# Patient Record
Sex: MALE | Race: WHITE
[De-identification: names, ages, dates, MRNs, and addresses within clinical notes are randomized per-mention and may not be internally consistent; named-entity substitution may affect disease eponyms.]

---

## 2017-08-08 ENCOUNTER — HOSPITAL ENCOUNTER (INPATIENT)
Dept: HOSPITAL 74 - YASAS | Age: 57
LOS: 2 days | Discharge: LEFT BEFORE DISCHARGE | DRG: 770 | End: 2017-08-10
Attending: INTERNAL MEDICINE | Admitting: INTERNAL MEDICINE
Payer: COMMERCIAL

## 2017-08-08 VITALS — BODY MASS INDEX: 23.6 KG/M2

## 2017-08-08 DIAGNOSIS — F19.24: ICD-10-CM

## 2017-08-08 DIAGNOSIS — K21.9: ICD-10-CM

## 2017-08-08 DIAGNOSIS — F17.213: ICD-10-CM

## 2017-08-08 DIAGNOSIS — J45.20: ICD-10-CM

## 2017-08-08 DIAGNOSIS — I10: ICD-10-CM

## 2017-08-08 DIAGNOSIS — F11.23: Primary | ICD-10-CM

## 2017-08-08 LAB
APPEARANCE UR: CLEAR
BILIRUB UR STRIP.AUTO-MCNC: NEGATIVE MG/DL
COLOR UR: YELLOW
KETONES UR QL STRIP: NEGATIVE
LEUKOCYTE ESTERASE UR QL STRIP.AUTO: NEGATIVE
NITRITE UR QL STRIP: NEGATIVE
PH UR: 6 [PH] (ref 5–8)
PROT UR QL STRIP: NEGATIVE
PROT UR QL STRIP: NEGATIVE
RBC # UR STRIP: NEGATIVE /UL
SP GR UR: 1.02 (ref 1–1.02)
UROBILINOGEN UR STRIP-MCNC: 2 MG/DL (ref 0.2–1)

## 2017-08-08 PROCEDURE — HZ2ZZZZ DETOXIFICATION SERVICES FOR SUBSTANCE ABUSE TREATMENT: ICD-10-PCS | Performed by: INTERNAL MEDICINE

## 2017-08-08 RX ADMIN — BUDESONIDE AND FORMOTEROL FUMARATE DIHYDRATE SCH INH: 80; 4.5 AEROSOL RESPIRATORY (INHALATION) at 22:19

## 2017-08-08 RX ADMIN — BUDESONIDE AND FORMOTEROL FUMARATE DIHYDRATE SCH INH: 80; 4.5 AEROSOL RESPIRATORY (INHALATION) at 13:24

## 2017-08-08 RX ADMIN — Medication SCH MG: at 22:18

## 2017-08-08 RX ADMIN — NICOTINE SCH: 21 PATCH TRANSDERMAL at 13:25

## 2017-08-08 RX ADMIN — LISINOPRIL SCH MG: 20 TABLET ORAL at 13:22

## 2017-08-08 NOTE — HP
COWS





- Scale


Resting Pulse: 0= [place] 80 or Below


Sweatin=Flushed/Facial Moisture


Restless Observation: 1= Difficult to Sit Still


Pupil Size: 1= Pupils >than Normal


Bone or Joint Aches: 1= Mild Discomfort


Runny Nose/ Eye Tearin= Runny Nose/Eyes


GI Upset > 30mins: 2= Nausea/Diarrhea


Tremor Observation: 2= Slight Tremor Visible


Yawning Observation: 1= 1-2x During Session


Anxiety or Irritability: 2=Irritable/Anxious


Goose Flesh Skin: 0=Smooth Skin


COWS Score: 14





Admission ROS S





- HPI


Chief Complaint: 


Withdrawal sx.


Allergies/Adverse Reactions: 


 Allergies











Allergy/AdvReac Type Severity Reaction Status Date / Time


 


No Known Allergies Allergy   Verified 17 10:17











History of Present Illness: 


56 y/o man with a long hx of drug dependence is admitted for detox. Pt. has 

been in previous detox, he was only drug free while incarcerated.


Exam Limitations: No Limitations





- Ebola screening


Have you traveled outside of the country in the last 21 days: No


Have you had contact with anyone from an Ebola affected area: No


Have you been sick,other than usual withdrawal symptoms: No


Do you have a fever: No





- Review of Systems


Constitutional: Diaphoresis


EENT: reports: Nose Congestion


Respiratory: reports: No Symptoms reported


Cardiac: reports: No Symptoms Reported


GI: reports: Nausea, Abdominal cramping


: reports: No Symptoms Reported


Musculoskeletal: reports: Joint Pain


Integumentary: reports: Sweating


Neuro: reports: Headache


Endocrine: reports: No Symptoms Reported


Hematology: reports: No Symptoms Reported


Psychiatric: reports: No Sypmtoms Reported


Other Systems: Reviewed and Negative





Patient History





- Patient Medical History


Hx Anemia: No


Hx Asthma: Yes


Hx Chronic Obstructive Pulmonary Disease (COPD): No


Hx Cancer: No


Hx Cardiac Disorders: No


Hx Congestive Heart Failure: No


Hx Hypertension: Yes


Hx Hypercholesterolemia: No


Hx Pacemaker: No


HX Cerebrovascular Accident: No


Hx Seizures: No


Hx Dementia: No


Hx Diabetes: No


Hx Gastrointestinal Disorders: Yes (acid reflux)


Hx Liver Disease: No


Hx Genitourinary Disorders: No


Hx Sexually Transmitted Disorders: No


Hx Renal Disease (ESRD): No


Hx Thyroid Disease: No


Hx Human Immunodeficiency Virus (HIV): No


Hx Hepatitis C: Yes (no tx. needed)


Hx Depression: Yes


Hx Suicide Attempt: No


Hx Bipolar Disorder: Yes (was on abilify,last taken a week ago)


Hx Schizophrenia: No


Other Medical History: PTSD





- Patient Surgical History


Past Surgical History: No


Hx Neurologic Surgery: No


Hx Cataract Extraction: No


Hx Cardiac Surgery: No


Hx Lung Surgery: No


Hx Breast Surgery: No


Hx Breast Biopsy: No


Hx Abdominal Surgery: No


Hx Appendectomy: No


Hx Cholecystectomy: No


Hx Genitourinary Surgery: No


Hx  Section: No


Hx Orthopedic Surgery: No


Anesthesia Reaction: No





- PPD History


Previous Implant?: Yes


Documented Results: Negative w/proof


Implanted On Prior R Admission?: Yes


Date: 16


Results: 0 mm


PPD to be Administered?: No





- Smoking Cessation


Smoking history: Current every day smoker


Have you smoked in the past 12 months: Yes


Aproximately how many cigarettes per day: 10


Hx Chewing Tobacco Use: No


Initiated information on smoking cessation: Yes


'Breaking Loose' booklet given: 17





- Substance & Tx. History


Hx Alcohol Use: No


Hx Substance Use: Yes


Substance Use Type: Heroin, Tranquilizers


Hx Substance Use Treatment: Yes (Detox)





- Substances Abused


  ** Heroin


Route: Inhalation


Frequency: Daily


Amount used: 5-6 bags


Age of first use: 16


Date of Last Use: 17





  ** Xanax


Route: Oral


Frequency: 1-2 times per week


Amount used: 1 mg.


Age of first use: 57


Date of Last Use: 17





Family Disease History





- Family Disease History


Family Disease History: Heart Disease: Mother (HTN)





Admission Physical Exam [facility]





- Vital Signs


Vital Signs: 


 Vital Signs - 24 hr











  17





  09:59


 


Temperature 97 F L


 


Pulse Rate 74


 


Respiratory 20





Rate 


 


Blood Pressure 163/96














- Physical


General Appearance: Yes: Tremorous, Anxious


HEENTM: Yes: Nasal Congestion, Rhinorrhea


Respiratory: Yes: Chest Non-Tender, Lungs Clear, Normal Breath Sounds


Neck: Yes: Supple


Breast: Yes: Breast Exam Deferred


Cardiology: Yes: Regular Rhythm, Regular Rate, S1, S2


Abdominal: Yes: Normal Bowel Sounds, Non Tender, Flat, Soft


Genitourinary: Yes: Within Normal Limits


Back: Yes: Within Normal Limits


Musculoskeletal: Yes: full range of Motion


Extremities: Yes: Tremors


Neurological: Yes: Fully Oriented, Alert


Integumentary: Yes: Diaphoresis


Lymphatic: Yes: Within Normal Limits





- Diagnostic


(1) Nicotine dependence


Current Visit: Yes   Status: Acute   Qualifiers: 


     Nicotine product type: cigarettes     Substance use status: uncomplicated 

    Qualified Code(s): F17.210 - Nicotine dependence, cigarettes, uncomplicated

  





(2) Opioid dependence with withdrawal


Current Visit: Yes   Status: Acute





(3) Asthma


Current Visit: Yes   Status: Chronic   Qualifiers: 


     Asthma severity: mild intermittent     Asthma complication type: 

uncomplicated        Qualified Code(s): J45.20 - Mild intermittent asthma, 

uncomplicated  





(4) Essential hypertension


Current Visit: Yes   Status: Chronic


Comment: .











Cleared for Admission [facility]





- Detox or Rehab


[facility] Level of Care: Medically Managed


Detox Regimen/Protocol: Methadone





[facility] Breath Alcohol Content


Breath Alcohol Content: 0





Urine Drug Screen





- Results


Drug Screen Negative: No


Urine Drug Screen Results: OPI-Opiates

## 2017-08-08 NOTE — EKG
Test Reason : 

Blood Pressure : ***/*** mmHG

Vent. Rate : 061 BPM     Atrial Rate : 061 BPM

   P-R Int : 160 ms          QRS Dur : 088 ms

    QT Int : 388 ms       P-R-T Axes : 078 017 033 degrees

   QTc Int : 390 ms

 

NORMAL SINUS RHYTHM

VOLTAGE CRITERIA FOR LEFT VENTRICULAR HYPERTROPHY

ABNORMAL ECG

NO PREVIOUS ECGS AVAILABLE

CLINICAL CORRELATION IS RECOMMENDED

Confirmed by FADY PARHAM MD ([id number]) on 8/8/2017 9:10:32 PM

 

Referred By:             Confirmed By:FADY PARHAM MD

## 2017-08-09 LAB
ALBUMIN SERPL-MCNC: 3.6 G/DL (ref 3.4–5)
ALP SERPL-CCNC: 75 U/L (ref 45–117)
ALT SERPL-CCNC: 17 U/L (ref 12–78)
ANION GAP SERPL CALC-SCNC: 5 MMOL/L (ref 8–16)
AST SERPL-CCNC: 14 U/L (ref 15–37)
BILIRUB SERPL-MCNC: 0.5 MG/DL (ref 0.2–1)
CALCIUM SERPL-MCNC: 9.1 MG/DL (ref 8.5–10.1)
CO2 SERPL-SCNC: 31 MMOL/L (ref 21–32)
CREAT SERPL-MCNC: 0.7 MG/DL (ref 0.7–1.3)
DEPRECATED RDW RBC AUTO: 12.8 % (ref 11.9–15.9)
GLUCOSE SERPL-MCNC: 77 MG/DL (ref 74–106)
MCH RBC QN AUTO: 31.3 PG (ref 25.7–33.7)
MCHC RBC AUTO-ENTMCNC: 33.4 G/DL (ref 32–35.9)
MCV RBC: 93.6 FL (ref 80–96)
PLATELET # BLD AUTO: 159 K/MM3 (ref 134–434)
PMV BLD: 9.5 FL (ref 7.5–11.1)
PROT SERPL-MCNC: 6.6 G/DL (ref 6.4–8.2)
WBC # BLD AUTO: 6.1 K/MM3 (ref 4–10)

## 2017-08-09 RX ADMIN — BUDESONIDE AND FORMOTEROL FUMARATE DIHYDRATE SCH: 80; 4.5 AEROSOL RESPIRATORY (INHALATION) at 22:25

## 2017-08-09 RX ADMIN — CYCLOBENZAPRINE HYDROCHLORIDE SCH: 10 TABLET, FILM COATED ORAL at 14:26

## 2017-08-09 RX ADMIN — NICOTINE SCH: 21 PATCH TRANSDERMAL at 10:02

## 2017-08-09 RX ADMIN — LISINOPRIL SCH MG: 20 TABLET ORAL at 10:02

## 2017-08-09 RX ADMIN — CYCLOBENZAPRINE HYDROCHLORIDE SCH: 10 TABLET, FILM COATED ORAL at 22:25

## 2017-08-09 RX ADMIN — Medication SCH: at 22:25

## 2017-08-09 RX ADMIN — BUDESONIDE AND FORMOTEROL FUMARATE DIHYDRATE SCH INH: 80; 4.5 AEROSOL RESPIRATORY (INHALATION) at 10:00

## 2017-08-09 RX ADMIN — Medication SCH TAB: at 09:59

## 2017-08-09 NOTE — CONSULT
[facility] Psychiatric Consult





- Data


Date of interview: 08/09/17


Admission source: [facility]


Identifying data: Another admission to [facility] for this 56 y/o  male 

seeking detox treatment [address] for opioid dependence.Patient is single,a 

father of one,domiciled and employed (in December 2016 he reported to this 

writer that he was on SSI benefits).


Substance Abuse History: Discussed in this session.Mr Cortes confirms this report 

as accurate.  Smoking Cessation.  Smoking history: Current every day smoker.  

Have you smoked in the past 12 months: Yes.  Aproximately how many cigarettes 

per day: 10.  Hx Chewing Tobacco Use: No.  Initiated information on smoking 

cessation: Yes.  'Breaking Loose' booklet given: 08/08/17.  - Substance & Tx. 

History.  Hx Alcohol Use: No.  Hx Substance Use: Yes.  Substance Use Type: 

Heroin, Tranquilizers.  Hx Substance Use Treatment: Yes (Detox).  - Substances 

Abused.  ** Heroin.  Route: Inhalation.  Frequency: Daily.  Amount used: 5-6 

bags.  Age of first use: 16.  Date of Last Use: 08/07/17.  ** Xanax.  Route: 

Oral.  Frequency: 1-2 times per week.  Amount used: 1 mg.  Age of first use: 

57.  Date of Last Use: 07/25/17


Medical History: Hypertension,GERD,bronchial asthma and spinal stenosis.


Psychiatric History: History as follows : first contact with Psychiatry was in 

2010 (after his release from senior care).Diagnosed with Bipolar Disorder and 

PTSD.Mr Cortes used to be on lamotrigine and other mood stabilizers.Known to [facility] OPD clinic.Non adherent to OPD care for several months and 

still not willing to resume psychotropic medications.No reported history of 

psychiatric hospitalizations or suicide attempts.


Physical/Sexual Abuse/Trauma History: Patient denies.


Additional Comment: Urine Drug Screen Results: OPI-Opiates.Noted.





Mental Status Exam





- Mental Status Exam


Alert and Oriented to: Time, Place, Person


Cognitive Function: Good


Patient Appearance: Well Groomed


Mood: Hopeful, Euthymic


Affect: Appropriate, Normal Range


Patient Behavior: Appropriate, Cooperative


Speech Pattern: Clear


Voice Loudness: Normal


Thought Process: Goal Oriented


Thought Disorder: Not Present


Hallucinations: Denies


Suicidal Ideation: Denies


Homicidal Ideation: Denies


Insight/Judgement: Poor


Sleep: Well


Appetite: Good


Muscle strength/Tone: Normal


Gait/Station: Normal





Psychiatric Findings





- Problem List (Axis 1, 2,3)


(1) Nicotine dependence


Current Visit: Yes   Status: Acute   Qualifiers: 


     Nicotine product type: cigarettes     Substance use status: uncomplicated 

    Qualified Code(s): F17.210 - Nicotine dependence, cigarettes, uncomplicated

  





(2) Opioid dependence with withdrawal


Current Visit: Yes   Status: Acute





(3) Substance induced mood disorder


Current Visit: Yes   Status: Acute





(4) Asthma


Current Visit: Yes   Status: Chronic   Qualifiers: 


     Asthma severity: mild intermittent     Asthma complication type: 

uncomplicated        Qualified Code(s): J45.20 - Mild intermittent asthma, 

uncomplicated  





(5) Essential hypertension


Current Visit: Yes   Status: Chronic


Comment: .











- Initial Treatment Plan


Initial Treatment Plan: Psychoeducation.Detoxification.Patient declines 

psychotropic medications other than detox protocol.Observation.

## 2017-08-09 NOTE — PN
[facility] COWS





- Scale


Resting Pulse: 1= [place] 


Sweatin= Chills/Flushing


Restless Observation: 3= Extraneous Movement


Pupil Size: 2= Moderately Dilated


Bone or Joint Aches: 2= Severe Diffuse Aches


Runny Nose/ Eye Tearin= Nasal Congestion


GI Upset > 30mins: 2= Nausea/Diarrhea


Tremor Observation of Outstretched Hands: 2= Slight Tremor Visible


Yawning Observation: 2= >3x During Session


Anxiety or Irritability: 2=Irritable/Anxious


Goose Flesh Skin: 0=Smooth Skin


COWS Score: 18





[facility] Progress Note (SOAP)


Subjective: 


BODY ACHE, HOT/COLD FLASHES,FATIGUE.


Objective: 





17 11:37


 Vital Signs











Temperature  97.8 F   17 09:35


 


Pulse Rate  93 H  17 09:35


 


Respiratory Rate  20   17 09:35


 


Blood Pressure  142/95   17 09:35


 


O2 Sat by Pulse Oximetry (%)      








 Laboratory Last Values











WBC  6.1 K/mm3 (4.0-10.0)   17  06:20    


 


RBC  4.18 M/mm3 (4.00-5.60)   17  06:20    


 


Hgb  13.1 GM/dL (11.7-16.9)   17  06:20    


 


Hct  39.1 % (35.4-49)   17  06:20    


 


MCV  93.6 fl (80-96)   17  06:20    


 


MCH  31.3 pg (25.7-33.7)   17  06:20    


 


MCHC  33.4 g/dl (32.0-35.9)   17  06:20    


 


RDW  12.8 % (11.9-15.9)   17  06:20    


 


Plt Count  159 K/MM3 (134-434)   17  06:20    


 


MPV  9.5 fl (7.5-11.1)   17  06:20    


 


Sodium  138 mmol/L (136-145)   17  06:20    


 


Potassium  4.9 mmol/L (3.5-5.1)   17  06:20    


 


Chloride  102 mmol/L ()   17  06:20    


 


Carbon Dioxide  31 mmol/L (21-32)   17  06:20    


 


Anion Gap  5  (8-16)  L  17  06:20    


 


BUN  13 mg/dL (7-18)   17  06:20    


 


Creatinine  0.7 mg/dL (0.7-1.3)   17  06:20    


 


Creat Clearance w eGFR  > 60  (>60)   17  06:20    


 


Random Glucose  77 mg/dL ()  D 17  06:20    


 


Calcium  9.1 mg/dL (8.5-10.1)   17  06:20    


 


Total Bilirubin  0.5 mg/dL (0.2-1.0)   17  06:20    


 


AST  14 U/L (15-37)  L  17  06:20    


 


ALT  17 U/L (12-78)   17  06:20    


 


Alkaline Phosphatase  75 U/L ()   17  06:20    


 


Total Protein  6.6 g/dl (6.4-8.2)   17  06:20    


 


Albumin  3.6 g/dl (3.4-5.0)   17  06:20    


 


Urine Color  Yellow   17  17:06    


 


Urine Appearance  Clear   17  17:06    


 


Urine pH  6.0  (5.0-8.0)   17  17:06    


 


Ur Specific Gravity  1.020  (1.005-1.025)   17  17:06    


 


Urine Protein  Negative  (NEGATIVE)   17  17:06    


 


Urine Glucose (UA)  Negative  (NEGATIVE)   17  17:06    


 


Urine Ketones  Negative  (NEGATIVE)   17  17:06    


 


Urine Blood  Negative  (NEGATIVE)   17  17:06    


 


Urine Nitrite  Negative  (NEGATIVE)   17  17:06    


 


Urine Bilirubin  Negative  (NEGATIVE)   17  17:06    


 


Urine Urobilinogen  2.0 mg/dL (0.2-1.0)   17  17:06    


 


Ur Leukocyte Esterase  Negative  (NEGATIVE)   17  17:06    


 


RPR Titer  Nonreactive  (NONREACTIVE)   17  06:20    











Assessment: 





17 11:37


WITHDRAWAL SX


Plan: 


CONTINUE DETOX

## 2017-08-10 VITALS — DIASTOLIC BLOOD PRESSURE: 70 MMHG | SYSTOLIC BLOOD PRESSURE: 105 MMHG | HEART RATE: 99 BPM | TEMPERATURE: 99 F

## 2017-08-10 RX ADMIN — LISINOPRIL SCH MG: 20 TABLET ORAL at 10:44

## 2017-08-10 RX ADMIN — Medication SCH TAB: at 10:44

## 2017-08-10 RX ADMIN — CYCLOBENZAPRINE HYDROCHLORIDE SCH: 10 TABLET, FILM COATED ORAL at 06:56

## 2017-08-10 RX ADMIN — BUDESONIDE AND FORMOTEROL FUMARATE DIHYDRATE SCH: 80; 4.5 AEROSOL RESPIRATORY (INHALATION) at 10:47

## 2017-08-10 RX ADMIN — NICOTINE SCH: 21 PATCH TRANSDERMAL at 10:44

## 2017-08-10 NOTE — PN
[facility] COWS





- Scale


Resting Pulse: 1= [place] 


Sweatin= Chills/Flushing


Restless Observation: 3= Extraneous Movement


Pupil Size: 2= Moderately Dilated


Bone or Joint Aches: 4=Acute Joint/Muscle Pain


Runny Nose/ Eye Tearin= Nasal Congestion


GI Upset > 30mins: 1= Stomach Cramp


Tremor Observation of Outstretched Hands: 2= Slight Tremor Visible


Yawning Observation: 1= 1-2x During Session


Anxiety or Irritability: 2=Irritable/Anxious


Goose Flesh Skin: 0=Smooth Skin


COWS Score: 18





[facility] Progress Note (SOAP)


Subjective: 


ANXIETY,IRRITABILITY,SWEATS,INTERMITTENT SLEEP. PT STATES HE IS ON LISINOPRIL 

AND NORVASC 10 MG FOR HTN.


Objective: 





08/10/17 10:48


 


 


 Vital Signs











  08/10/17 08/10/17 08/10/17





  06:34 07:41 09:27


 


Temperature 97.3 F L  99.0 F


 


Pulse Rate 82 68 99 H


 


Respiratory 18  18





Rate   


 


Blood Pressure 163/103 176/105 105/70




















 Laboratory Last Values











WBC  6.1 K/mm3 (4.0-10.0)   17  06:20    


 


RBC  4.18 M/mm3 (4.00-5.60)   17  06:20    


 


Hgb  13.1 GM/dL (11.7-16.9)   17  06:20    


 


Hct  39.1 % (35.4-49)   17  06:20    


 


MCV  93.6 fl (80-96)   17  06:20    


 


MCH  31.3 pg (25.7-33.7)   17  06:20    


 


MCHC  33.4 g/dl (32.0-35.9)   17  06:20    


 


RDW  12.8 % (11.9-15.9)   17  06:20    


 


Plt Count  159 K/MM3 (134-434)   17  06:20    


 


MPV  9.5 fl (7.5-11.1)   17  06:20    


 


Sodium  138 mmol/L (136-145)   17  06:20    


 


Potassium  4.9 mmol/L (3.5-5.1)   17  06:20    


 


Chloride  102 mmol/L ()   17  06:20    


 


Carbon Dioxide  31 mmol/L (21-32)   17  06:20    


 


Anion Gap  5  (8-16)  L  17  06:20    


 


BUN  13 mg/dL (7-18)   17  06:20    


 


Creatinine  0.7 mg/dL (0.7-1.3)   17  06:20    


 


Creat Clearance w eGFR  > 60  (>60)   17  06:20    


 


Random Glucose  77 mg/dL ()  D 17  06:20    


 


Calcium  9.1 mg/dL (8.5-10.1)   17  06:20    


 


Total Bilirubin  0.5 mg/dL (0.2-1.0)   17  06:20    


 


AST  14 U/L (15-37)  L  17  06:20    


 


ALT  17 U/L (12-78)   17  06:20    


 


Alkaline Phosphatase  75 U/L ()   17  06:20    


 


Total Protein  6.6 g/dl (6.4-8.2)   17  06:20    


 


Albumin  3.6 g/dl (3.4-5.0)   17  06:20    


 


Urine Color  Yellow   17  17:06    


 


Urine Appearance  Clear   17  17:06    


 


Urine pH  6.0  (5.0-8.0)   17  17:06    


 


Ur Specific Gravity  1.020  (1.005-1.025)   17  17:06    


 


Urine Protein  Negative  (NEGATIVE)   17  17:06    


 


Urine Glucose (UA)  Negative  (NEGATIVE)   17  17:06    


 


Urine Ketones  Negative  (NEGATIVE)   17  17:06    


 


Urine Blood  Negative  (NEGATIVE)   17  17:06    


 


Urine Nitrite  Negative  (NEGATIVE)   17  17:06    


 


Urine Bilirubin  Negative  (NEGATIVE)   17  17:06    


 


Urine Urobilinogen  2.0 mg/dL (0.2-1.0)   17  17:06    


 


Ur Leukocyte Esterase  Negative  (NEGATIVE)   17  17:06    


 


RPR Titer  Nonreactive  (NONREACTIVE)   17  06:20    




















Assessment: 





08/10/17 10:49


WITHDRAWAL SX


Plan: 


CONTINUE DETOX

## 2017-08-10 NOTE — DS
[facility] Detox Discharge Summary


Admission Date: 


08/08/17





Discharge Date: 08/10/17





- History


Present History: Opioid Dependence, Sedative Dependence


Additional Comments: 


PT DECLINED CONTINUING WITH DETOX FOR PERSONAL REASONS UNKNOWN TO WRITER. ALERT 

O X 3. NAD.  


PT MAY FOLLOW UP WITH PCP FOR MEDICAL MANAGEMENT AT [facility].


Pertinent Past History: 


ASTHMA


HTN


GERD





- Physical Exam Results


Vital Signs: 


 Vital Signs











Temperature  99.0 F   08/10/17 09:27


 


Pulse Rate  99 H  08/10/17 09:27


 


Respiratory Rate  18   08/10/17 09:27


 


Blood Pressure  105/70   08/10/17 09:27


 


O2 Sat by Pulse Oximetry (%)      











Pertinent Admission Physical Exam Findings: 


WITHDRAWAL SX


 Laboratory Last Values











WBC  6.1 K/mm3 (4.0-10.0)   08/09/17  06:20    


 


RBC  4.18 M/mm3 (4.00-5.60)   08/09/17  06:20    


 


Hgb  13.1 GM/dL (11.7-16.9)   08/09/17  06:20    


 


Hct  39.1 % (35.4-49)   08/09/17  06:20    


 


MCV  93.6 fl (80-96)   08/09/17  06:20    


 


MCH  31.3 pg (25.7-33.7)   08/09/17  06:20    


 


MCHC  33.4 g/dl (32.0-35.9)   08/09/17  06:20    


 


RDW  12.8 % (11.9-15.9)   08/09/17  06:20    


 


Plt Count  159 K/MM3 (134-434)   08/09/17  06:20    


 


MPV  9.5 fl (7.5-11.1)   08/09/17  06:20    


 


Sodium  138 mmol/L (136-145)   08/09/17  06:20    


 


Potassium  4.9 mmol/L (3.5-5.1)   08/09/17  06:20    


 


Chloride  102 mmol/L ()   08/09/17  06:20    


 


Carbon Dioxide  31 mmol/L (21-32)   08/09/17  06:20    


 


Anion Gap  5  (8-16)  L  08/09/17  06:20    


 


BUN  13 mg/dL (7-18)   08/09/17  06:20    


 


Creatinine  0.7 mg/dL (0.7-1.3)   08/09/17  06:20    


 


Creat Clearance w eGFR  > 60  (>60)   08/09/17  06:20    


 


Random Glucose  77 mg/dL ()  D 08/09/17  06:20    


 


Calcium  9.1 mg/dL (8.5-10.1)   08/09/17  06:20    


 


Total Bilirubin  0.5 mg/dL (0.2-1.0)   08/09/17  06:20    


 


AST  14 U/L (15-37)  L  08/09/17  06:20    


 


ALT  17 U/L (12-78)   08/09/17  06:20    


 


Alkaline Phosphatase  75 U/L ()   08/09/17  06:20    


 


Total Protein  6.6 g/dl (6.4-8.2)   08/09/17  06:20    


 


Albumin  3.6 g/dl (3.4-5.0)   08/09/17  06:20    


 


Urine Color  Yellow   08/08/17  17:06    


 


Urine Appearance  Clear   08/08/17  17:06    


 


Urine pH  6.0  (5.0-8.0)   08/08/17  17:06    


 


Ur Specific Gravity  1.020  (1.005-1.025)   08/08/17  17:06    


 


Urine Protein  Negative  (NEGATIVE)   08/08/17  17:06    


 


Urine Glucose (UA)  Negative  (NEGATIVE)   08/08/17  17:06    


 


Urine Ketones  Negative  (NEGATIVE)   08/08/17  17:06    


 


Urine Blood  Negative  (NEGATIVE)   08/08/17  17:06    


 


Urine Nitrite  Negative  (NEGATIVE)   08/08/17  17:06    


 


Urine Bilirubin  Negative  (NEGATIVE)   08/08/17  17:06    


 


Urine Urobilinogen  2.0 mg/dL (0.2-1.0)   08/08/17  17:06    


 


Ur Leukocyte Esterase  Negative  (NEGATIVE)   08/08/17  17:06    


 


RPR Titer  Nonreactive  (NONREACTIVE)   08/09/17  06:20    














- Treatment


Hospital Course: Discharged Condition Good (NAD)


Patient has Accepted a Rehab Referral to: [facility]





- Medication


Discharge Medications: 


Ambulatory Orders





Albuterol Sulfate Inhaler - [Ventolin HFA Inhaler -] 0 puff IH Q4HPO PRN #1 

inhaler 12/14/15 


Aripiprazole [Abilify -] 5 mg PO DAILY #30 tablet 12/14/15 


Budesonide/Formeterol Fumarate [SYMBICORT 80/4.5mcg -] 1 puff IH BID 12/16/16 


Lisinopril [Prinivil] 20 mg PO DAILY 08/08/17 


Omeprazole 20 mg PO DAILY 08/08/17 











- Diagnosis


(1) Nicotine dependence


Status: Acute   Qualifiers: 


     Nicotine product type: cigarettes     Substance use status: in withdrawal 

       Qualified Code(s): F17.213 - Nicotine dependence, cigarettes, with 

withdrawal  





(2) Opioid dependence with withdrawal


Status: Acute





(3) Asthma


Status: Chronic   Qualifiers: 


     Asthma severity: mild intermittent     Asthma complication type: 

uncomplicated        Qualified Code(s): J45.20 - Mild intermittent asthma, 

uncomplicated  





(4) Essential hypertension


Status: Chronic





(5) GERD (gastroesophageal reflux disease)


Status: Acute   








- AMA


Did Patient Leave Against Medical Advice: Yes (AMA)

## 2021-09-03 ENCOUNTER — HOSPITAL ENCOUNTER (INPATIENT)
Dept: HOSPITAL 74 - YASAS | Age: 61
LOS: 2 days | Discharge: LEFT BEFORE DISCHARGE | DRG: 770 | End: 2021-09-05
Attending: ALLERGY & IMMUNOLOGY | Admitting: ALLERGY & IMMUNOLOGY
Payer: COMMERCIAL

## 2021-09-03 VITALS — BODY MASS INDEX: 20.2 KG/M2

## 2021-09-03 DIAGNOSIS — F11.23: Primary | ICD-10-CM

## 2021-09-03 DIAGNOSIS — R63.4: ICD-10-CM

## 2021-09-03 DIAGNOSIS — M54.5: ICD-10-CM

## 2021-09-03 DIAGNOSIS — G89.29: ICD-10-CM

## 2021-09-03 DIAGNOSIS — I10: ICD-10-CM

## 2021-09-03 DIAGNOSIS — F12.10: ICD-10-CM

## 2021-09-03 DIAGNOSIS — F10.20: ICD-10-CM

## 2021-09-03 DIAGNOSIS — F17.210: ICD-10-CM

## 2021-09-03 DIAGNOSIS — J45.20: ICD-10-CM

## 2021-09-03 PROCEDURE — HZ2ZZZZ DETOXIFICATION SERVICES FOR SUBSTANCE ABUSE TREATMENT: ICD-10-PCS | Performed by: ALLERGY & IMMUNOLOGY

## 2021-09-03 PROCEDURE — U0003 INFECTIOUS AGENT DETECTION BY NUCLEIC ACID (DNA OR RNA); SEVERE ACUTE RESPIRATORY SYNDROME CORONAVIRUS 2 (SARS-COV-2) (CORONAVIRUS DISEASE [COVID-19]), AMPLIFIED PROBE TECHNIQUE, MAKING USE OF HIGH THROUGHPUT TECHNOLOGIES AS DESCRIBED BY CMS-2020-01-R: HCPCS

## 2021-09-03 PROCEDURE — C9803 HOPD COVID-19 SPEC COLLECT: HCPCS

## 2021-09-03 PROCEDURE — U0005 INFEC AGEN DETEC AMPLI PROBE: HCPCS

## 2021-09-03 RX ADMIN — Medication SCH MG: at 22:14

## 2021-09-03 RX ADMIN — BUDESONIDE AND FORMOTEROL FUMARATE DIHYDRATE SCH: 80; 4.5 AEROSOL RESPIRATORY (INHALATION) at 22:14

## 2021-09-03 RX ADMIN — BUDESONIDE AND FORMOTEROL FUMARATE DIHYDRATE SCH PUFF: 80; 4.5 AEROSOL RESPIRATORY (INHALATION) at 15:21

## 2021-09-04 LAB
ALBUMIN SERPL-MCNC: 2.9 G/DL (ref 3.4–5)
ALP SERPL-CCNC: 76 U/L (ref 45–117)
ALT SERPL-CCNC: 38 U/L (ref 13–61)
ANION GAP SERPL CALC-SCNC: 4 MMOL/L (ref 8–16)
AST SERPL-CCNC: 26 U/L (ref 15–37)
BILIRUB SERPL-MCNC: 0.2 MG/DL (ref 0.2–1)
BUN SERPL-MCNC: 21.4 MG/DL (ref 7–18)
CALCIUM SERPL-MCNC: 8.6 MG/DL (ref 8.5–10.1)
CHLORIDE SERPL-SCNC: 106 MMOL/L (ref 98–107)
CO2 SERPL-SCNC: 31 MMOL/L (ref 21–32)
CREAT SERPL-MCNC: 0.8 MG/DL (ref 0.55–1.3)
DEPRECATED RDW RBC AUTO: 13.4 % (ref 11.9–15.9)
GLUCOSE SERPL-MCNC: 87 MG/DL (ref 74–106)
HCT VFR BLD CALC: 36.2 % (ref 35.4–49)
HGB BLD-MCNC: 12.5 GM/DL (ref 11.7–16.9)
MCH RBC QN AUTO: 35.4 PG (ref 25.7–33.7)
MCHC RBC AUTO-ENTMCNC: 34.6 G/DL (ref 32–35.9)
MCV RBC: 102.3 FL (ref 80–96)
PLATELET # BLD AUTO: 264 10^3/UL (ref 134–434)
PMV BLD: 9.1 FL (ref 7.5–11.1)
PROT SERPL-MCNC: 6.7 G/DL (ref 6.4–8.2)
RBC # BLD AUTO: 3.54 M/MM3 (ref 4–5.6)
SODIUM SERPL-SCNC: 141 MMOL/L (ref 136–145)
WBC # BLD AUTO: 4.7 K/MM3 (ref 4–10)

## 2021-09-04 RX ADMIN — Medication SCH: at 23:37

## 2021-09-04 RX ADMIN — BUDESONIDE AND FORMOTEROL FUMARATE DIHYDRATE SCH: 80; 4.5 AEROSOL RESPIRATORY (INHALATION) at 23:37

## 2021-09-04 RX ADMIN — Medication SCH: at 23:36

## 2021-09-04 RX ADMIN — BUDESONIDE AND FORMOTEROL FUMARATE DIHYDRATE SCH PUFF: 80; 4.5 AEROSOL RESPIRATORY (INHALATION) at 10:33

## 2021-09-05 VITALS — DIASTOLIC BLOOD PRESSURE: 104 MMHG | TEMPERATURE: 97.1 F | SYSTOLIC BLOOD PRESSURE: 157 MMHG | HEART RATE: 83 BPM

## 2021-11-10 ENCOUNTER — APPOINTMENT (OUTPATIENT)
Dept: OPHTHALMOLOGY | Facility: CLINIC | Age: 61
End: 2021-11-10

## 2021-12-29 ENCOUNTER — HOSPITAL ENCOUNTER (INPATIENT)
Dept: HOSPITAL 74 - YASAS | Age: 61
LOS: 6 days | Discharge: HOME | DRG: 773 | End: 2022-01-04
Attending: ALLERGY & IMMUNOLOGY | Admitting: ALLERGY & IMMUNOLOGY
Payer: COMMERCIAL

## 2021-12-29 VITALS — BODY MASS INDEX: 21.2 KG/M2

## 2021-12-29 DIAGNOSIS — F13.230: ICD-10-CM

## 2021-12-29 DIAGNOSIS — F11.23: ICD-10-CM

## 2021-12-29 DIAGNOSIS — J45.20: ICD-10-CM

## 2021-12-29 DIAGNOSIS — I10: ICD-10-CM

## 2021-12-29 DIAGNOSIS — F43.10: ICD-10-CM

## 2021-12-29 DIAGNOSIS — F17.210: ICD-10-CM

## 2021-12-29 DIAGNOSIS — F10.230: Primary | ICD-10-CM

## 2021-12-29 DIAGNOSIS — Z85.118: ICD-10-CM

## 2021-12-29 PROCEDURE — U0005 INFEC AGEN DETEC AMPLI PROBE: HCPCS

## 2021-12-29 PROCEDURE — U0003 INFECTIOUS AGENT DETECTION BY NUCLEIC ACID (DNA OR RNA); SEVERE ACUTE RESPIRATORY SYNDROME CORONAVIRUS 2 (SARS-COV-2) (CORONAVIRUS DISEASE [COVID-19]), AMPLIFIED PROBE TECHNIQUE, MAKING USE OF HIGH THROUGHPUT TECHNOLOGIES AS DESCRIBED BY CMS-2020-01-R: HCPCS

## 2021-12-29 PROCEDURE — HZ2ZZZZ DETOXIFICATION SERVICES FOR SUBSTANCE ABUSE TREATMENT: ICD-10-PCS | Performed by: ALLERGY & IMMUNOLOGY

## 2021-12-29 PROCEDURE — C9803 HOPD COVID-19 SPEC COLLECT: HCPCS

## 2021-12-29 RX ADMIN — Medication SCH MG: at 22:34

## 2021-12-29 RX ADMIN — HYDROXYZINE PAMOATE SCH MG: 25 CAPSULE ORAL at 14:10

## 2021-12-29 RX ADMIN — HYDROXYZINE PAMOATE SCH MG: 25 CAPSULE ORAL at 22:34

## 2021-12-29 RX ADMIN — HYDROXYZINE PAMOATE SCH MG: 25 CAPSULE ORAL at 18:20

## 2021-12-30 LAB
ALBUMIN SERPL-MCNC: 3.9 G/DL (ref 3.4–5)
ALP SERPL-CCNC: 141 U/L (ref 45–117)
ALT SERPL-CCNC: 17 U/L (ref 13–61)
ANION GAP SERPL CALC-SCNC: 9 MMOL/L (ref 8–16)
AST SERPL-CCNC: 20 U/L (ref 15–37)
BILIRUB SERPL-MCNC: 1.2 MG/DL (ref 0.2–1)
BUN SERPL-MCNC: 24.9 MG/DL (ref 7–18)
CALCIUM SERPL-MCNC: 9.2 MG/DL (ref 8.5–10.1)
CHLORIDE SERPL-SCNC: 104 MMOL/L (ref 98–107)
CO2 SERPL-SCNC: 27 MMOL/L (ref 21–32)
CREAT SERPL-MCNC: 1.1 MG/DL (ref 0.55–1.3)
DEPRECATED RDW RBC AUTO: 13.5 % (ref 11.9–15.9)
GLUCOSE SERPL-MCNC: 137 MG/DL (ref 74–106)
HCT VFR BLD CALC: 37 % (ref 35.4–49)
HGB BLD-MCNC: 12.8 GM/DL (ref 11.7–16.9)
MCH RBC QN AUTO: 34.3 PG (ref 25.7–33.7)
MCHC RBC AUTO-ENTMCNC: 34.5 G/DL (ref 32–35.9)
MCV RBC: 99.6 FL (ref 80–96)
PLATELET # BLD AUTO: 210 10^3/UL (ref 134–434)
PMV BLD: 8.8 FL (ref 7.5–11.1)
PROT SERPL-MCNC: 7.4 G/DL (ref 6.4–8.2)
RBC # BLD AUTO: 3.72 M/MM3 (ref 4–5.6)
SODIUM SERPL-SCNC: 140 MMOL/L (ref 136–145)
WBC # BLD AUTO: 6.4 K/MM3 (ref 4–10)

## 2021-12-30 RX ADMIN — METHOCARBAMOL PRN MG: 500 TABLET ORAL at 10:10

## 2021-12-30 RX ADMIN — ONDANSETRON PRN MG: 4 TABLET, ORALLY DISINTEGRATING ORAL at 23:27

## 2021-12-30 RX ADMIN — HYDROXYZINE PAMOATE SCH MG: 25 CAPSULE ORAL at 05:36

## 2021-12-30 RX ADMIN — Medication SCH MG: at 23:24

## 2021-12-30 RX ADMIN — HYDROXYZINE PAMOATE SCH MG: 25 CAPSULE ORAL at 23:24

## 2021-12-30 RX ADMIN — LISINOPRIL SCH MG: 20 TABLET ORAL at 10:10

## 2021-12-30 RX ADMIN — HYDROXYZINE PAMOATE SCH MG: 25 CAPSULE ORAL at 17:41

## 2021-12-30 RX ADMIN — NIFEDIPINE SCH MG: 60 TABLET, EXTENDED RELEASE ORAL at 10:35

## 2021-12-30 RX ADMIN — HYDROXYZINE PAMOATE SCH: 25 CAPSULE ORAL at 14:39

## 2021-12-30 RX ADMIN — Medication SCH TAB: at 10:10

## 2021-12-30 RX ADMIN — HYDROXYZINE PAMOATE SCH: 25 CAPSULE ORAL at 10:13

## 2021-12-30 RX ADMIN — ONDANSETRON PRN MG: 4 TABLET, ORALLY DISINTEGRATING ORAL at 05:36

## 2021-12-31 RX ADMIN — HYDROXYZINE PAMOATE SCH: 25 CAPSULE ORAL at 10:25

## 2021-12-31 RX ADMIN — HYDROXYZINE PAMOATE SCH: 25 CAPSULE ORAL at 14:08

## 2021-12-31 RX ADMIN — HYDROXYZINE PAMOATE SCH MG: 25 CAPSULE ORAL at 23:04

## 2021-12-31 RX ADMIN — NIFEDIPINE SCH MG: 60 TABLET, EXTENDED RELEASE ORAL at 10:26

## 2021-12-31 RX ADMIN — METHOCARBAMOL PRN MG: 500 TABLET ORAL at 10:24

## 2021-12-31 RX ADMIN — LISINOPRIL SCH MG: 20 TABLET ORAL at 10:24

## 2021-12-31 RX ADMIN — HYDROXYZINE PAMOATE SCH MG: 25 CAPSULE ORAL at 17:04

## 2021-12-31 RX ADMIN — Medication SCH MG: at 22:37

## 2021-12-31 RX ADMIN — Medication SCH TAB: at 10:24

## 2021-12-31 RX ADMIN — HYDROXYZINE PAMOATE SCH MG: 25 CAPSULE ORAL at 05:20

## 2022-01-01 RX ADMIN — Medication SCH MG: at 22:41

## 2022-01-01 RX ADMIN — Medication SCH TAB: at 10:15

## 2022-01-01 RX ADMIN — BUDESONIDE AND FORMOTEROL FUMARATE DIHYDRATE SCH: 80; 4.5 AEROSOL RESPIRATORY (INHALATION) at 12:56

## 2022-01-01 RX ADMIN — METHOCARBAMOL PRN MG: 500 TABLET ORAL at 10:15

## 2022-01-01 RX ADMIN — HYDROXYZINE PAMOATE SCH MG: 25 CAPSULE ORAL at 10:15

## 2022-01-01 RX ADMIN — HYDROXYZINE PAMOATE SCH: 25 CAPSULE ORAL at 14:22

## 2022-01-01 RX ADMIN — LISINOPRIL SCH MG: 20 TABLET ORAL at 10:15

## 2022-01-01 RX ADMIN — NIFEDIPINE SCH MG: 60 TABLET, EXTENDED RELEASE ORAL at 10:15

## 2022-01-01 RX ADMIN — HYDROXYZINE PAMOATE SCH MG: 25 CAPSULE ORAL at 05:24

## 2022-01-01 RX ADMIN — HYDROXYZINE PAMOATE SCH MG: 25 CAPSULE ORAL at 17:24

## 2022-01-01 RX ADMIN — HYDROXYZINE PAMOATE SCH MG: 25 CAPSULE ORAL at 22:41

## 2022-01-01 RX ADMIN — Medication SCH MG: at 22:40

## 2022-01-02 RX ADMIN — HYDROXYZINE PAMOATE SCH MG: 25 CAPSULE ORAL at 05:32

## 2022-01-02 RX ADMIN — HYDROXYZINE PAMOATE SCH MG: 25 CAPSULE ORAL at 17:26

## 2022-01-02 RX ADMIN — HYDROXYZINE PAMOATE SCH MG: 25 CAPSULE ORAL at 10:26

## 2022-01-02 RX ADMIN — BUDESONIDE AND FORMOTEROL FUMARATE DIHYDRATE SCH: 80; 4.5 AEROSOL RESPIRATORY (INHALATION) at 22:21

## 2022-01-02 RX ADMIN — Medication SCH MG: at 22:21

## 2022-01-02 RX ADMIN — Medication SCH MG: at 22:20

## 2022-01-02 RX ADMIN — BUDESONIDE AND FORMOTEROL FUMARATE DIHYDRATE SCH: 80; 4.5 AEROSOL RESPIRATORY (INHALATION) at 10:27

## 2022-01-02 RX ADMIN — NIFEDIPINE SCH MG: 60 TABLET, EXTENDED RELEASE ORAL at 10:24

## 2022-01-02 RX ADMIN — HYDROXYZINE PAMOATE SCH MG: 25 CAPSULE ORAL at 22:20

## 2022-01-02 RX ADMIN — METHOCARBAMOL PRN MG: 500 TABLET ORAL at 10:28

## 2022-01-02 RX ADMIN — HYDROXYZINE PAMOATE SCH: 25 CAPSULE ORAL at 14:40

## 2022-01-02 RX ADMIN — BUDESONIDE AND FORMOTEROL FUMARATE DIHYDRATE SCH: 80; 4.5 AEROSOL RESPIRATORY (INHALATION) at 00:47

## 2022-01-02 RX ADMIN — LISINOPRIL SCH MG: 20 TABLET ORAL at 10:24

## 2022-01-02 RX ADMIN — Medication SCH TAB: at 10:26

## 2022-01-03 RX ADMIN — HYDROXYZINE PAMOATE SCH MG: 25 CAPSULE ORAL at 14:54

## 2022-01-03 RX ADMIN — HYDROXYZINE PAMOATE SCH MG: 25 CAPSULE ORAL at 17:07

## 2022-01-03 RX ADMIN — NICOTINE PRN MG: 4 INHALANT RESPIRATORY (INHALATION) at 14:55

## 2022-01-03 RX ADMIN — ACETAMINOPHEN PRN MG: 325 TABLET ORAL at 17:47

## 2022-01-03 RX ADMIN — METHOCARBAMOL PRN MG: 500 TABLET ORAL at 10:38

## 2022-01-03 RX ADMIN — LISINOPRIL SCH MG: 20 TABLET ORAL at 10:38

## 2022-01-03 RX ADMIN — Medication SCH TAB: at 10:37

## 2022-01-03 RX ADMIN — Medication SCH MG: at 21:41

## 2022-01-03 RX ADMIN — Medication SCH MG: at 21:40

## 2022-01-03 RX ADMIN — HYDROXYZINE PAMOATE SCH MG: 25 CAPSULE ORAL at 10:38

## 2022-01-03 RX ADMIN — ACETAMINOPHEN PRN MG: 325 TABLET ORAL at 01:28

## 2022-01-03 RX ADMIN — HYDROXYZINE PAMOATE SCH MG: 25 CAPSULE ORAL at 05:34

## 2022-01-03 RX ADMIN — HYDROXYZINE PAMOATE SCH MG: 25 CAPSULE ORAL at 21:41

## 2022-01-03 RX ADMIN — BUDESONIDE AND FORMOTEROL FUMARATE DIHYDRATE SCH PUFF: 80; 4.5 AEROSOL RESPIRATORY (INHALATION) at 10:37

## 2022-01-03 RX ADMIN — NIFEDIPINE SCH MG: 60 TABLET, EXTENDED RELEASE ORAL at 10:39

## 2022-01-03 RX ADMIN — BUDESONIDE AND FORMOTEROL FUMARATE DIHYDRATE SCH: 80; 4.5 AEROSOL RESPIRATORY (INHALATION) at 21:41

## 2022-01-04 VITALS — TEMPERATURE: 97.8 F | HEART RATE: 83 BPM | DIASTOLIC BLOOD PRESSURE: 86 MMHG | SYSTOLIC BLOOD PRESSURE: 128 MMHG

## 2022-01-04 RX ADMIN — HYDROXYZINE PAMOATE SCH MG: 25 CAPSULE ORAL at 05:23

## 2022-01-04 RX ADMIN — NICOTINE PRN MG: 4 INHALANT RESPIRATORY (INHALATION) at 05:45

## 2022-02-03 ENCOUNTER — APPOINTMENT (OUTPATIENT)
Dept: OPHTHALMOLOGY | Facility: CLINIC | Age: 62
End: 2022-02-03
Payer: MEDICAID

## 2022-02-03 ENCOUNTER — NON-APPOINTMENT (OUTPATIENT)
Age: 62
End: 2022-02-03

## 2022-02-03 PROBLEM — Z00.00 ENCOUNTER FOR PREVENTIVE HEALTH EXAMINATION: Status: ACTIVE | Noted: 2022-02-03

## 2022-02-03 PROCEDURE — 92004 COMPRE OPH EXAM NEW PT 1/>: CPT

## 2022-04-14 ENCOUNTER — HOSPITAL ENCOUNTER (INPATIENT)
Dept: HOSPITAL 74 - YASAS | Age: 62
LOS: 4 days | Discharge: HOME | DRG: 773 | End: 2022-04-18
Attending: ALLERGY & IMMUNOLOGY | Admitting: ALLERGY & IMMUNOLOGY
Payer: COMMERCIAL

## 2022-04-14 VITALS — BODY MASS INDEX: 21.1 KG/M2

## 2022-04-14 DIAGNOSIS — M54.50: ICD-10-CM

## 2022-04-14 DIAGNOSIS — I10: ICD-10-CM

## 2022-04-14 DIAGNOSIS — C34.90: ICD-10-CM

## 2022-04-14 DIAGNOSIS — F11.23: Primary | ICD-10-CM

## 2022-04-14 DIAGNOSIS — G89.29: ICD-10-CM

## 2022-04-14 DIAGNOSIS — F17.210: ICD-10-CM

## 2022-04-14 DIAGNOSIS — F43.10: ICD-10-CM

## 2022-04-14 DIAGNOSIS — F13.230: ICD-10-CM

## 2022-04-14 DIAGNOSIS — M48.061: ICD-10-CM

## 2022-04-14 DIAGNOSIS — J45.20: ICD-10-CM

## 2022-04-14 DIAGNOSIS — F13.20: ICD-10-CM

## 2022-04-14 PROCEDURE — U0005 INFEC AGEN DETEC AMPLI PROBE: HCPCS

## 2022-04-14 PROCEDURE — U0003 INFECTIOUS AGENT DETECTION BY NUCLEIC ACID (DNA OR RNA); SEVERE ACUTE RESPIRATORY SYNDROME CORONAVIRUS 2 (SARS-COV-2) (CORONAVIRUS DISEASE [COVID-19]), AMPLIFIED PROBE TECHNIQUE, MAKING USE OF HIGH THROUGHPUT TECHNOLOGIES AS DESCRIBED BY CMS-2020-01-R: HCPCS

## 2022-04-14 PROCEDURE — HZ2ZZZZ DETOXIFICATION SERVICES FOR SUBSTANCE ABUSE TREATMENT: ICD-10-PCS | Performed by: ALLERGY & IMMUNOLOGY

## 2022-04-14 RX ADMIN — METHOCARBAMOL PRN MG: 500 TABLET ORAL at 19:18

## 2022-04-14 RX ADMIN — ACETAMINOPHEN PRN MG: 325 TABLET ORAL at 22:21

## 2022-04-14 RX ADMIN — BUDESONIDE AND FORMOTEROL FUMARATE DIHYDRATE SCH: 80; 4.5 AEROSOL RESPIRATORY (INHALATION) at 23:03

## 2022-04-14 RX ADMIN — HYDROXYZINE PAMOATE SCH MG: 25 CAPSULE ORAL at 17:43

## 2022-04-14 RX ADMIN — GUAIFENESIN PRN ML: 100 SOLUTION ORAL at 19:31

## 2022-04-14 RX ADMIN — ALBUTEROL SULFATE PRN PUFF: 90 AEROSOL, METERED RESPIRATORY (INHALATION) at 15:50

## 2022-04-14 RX ADMIN — Medication SCH MG: at 22:20

## 2022-04-14 RX ADMIN — ALBUTEROL SULFATE PRN PUFF: 90 AEROSOL, METERED RESPIRATORY (INHALATION) at 22:23

## 2022-04-14 RX ADMIN — HYDROXYZINE PAMOATE SCH MG: 25 CAPSULE ORAL at 22:20

## 2022-04-14 RX ADMIN — ACETAMINOPHEN PRN MG: 325 TABLET ORAL at 19:18

## 2022-04-14 RX ADMIN — HYDROXYZINE PAMOATE SCH: 25 CAPSULE ORAL at 13:57

## 2022-04-14 RX ADMIN — ALBUTEROL SULFATE PRN PUFF: 90 AEROSOL, METERED RESPIRATORY (INHALATION) at 13:00

## 2022-04-15 LAB
ALBUMIN SERPL-MCNC: 3.7 G/DL (ref 3.4–5)
ALP SERPL-CCNC: 371 U/L (ref 45–117)
ALT SERPL-CCNC: 12 U/L (ref 13–61)
ANION GAP SERPL CALC-SCNC: 8 MMOL/L (ref 8–16)
AST SERPL-CCNC: 34 U/L (ref 15–37)
BILIRUB SERPL-MCNC: 0.4 MG/DL (ref 0.2–1)
BUN SERPL-MCNC: 21.8 MG/DL (ref 7–18)
CALCIUM SERPL-MCNC: 9.2 MG/DL (ref 8.5–10.1)
CHLORIDE SERPL-SCNC: 102 MMOL/L (ref 98–107)
CO2 SERPL-SCNC: 31 MMOL/L (ref 21–32)
CREAT SERPL-MCNC: 1 MG/DL (ref 0.55–1.3)
DEPRECATED RDW RBC AUTO: 13.8 % (ref 11.9–15.9)
GLUCOSE SERPL-MCNC: 107 MG/DL (ref 74–106)
HCT VFR BLD CALC: 39.1 % (ref 35.4–49)
HGB BLD-MCNC: 13.3 GM/DL (ref 11.7–16.9)
MCH RBC QN AUTO: 33.4 PG (ref 25.7–33.7)
MCHC RBC AUTO-ENTMCNC: 34.1 G/DL (ref 32–35.9)
MCV RBC: 98.1 FL (ref 80–96)
PLATELET # BLD AUTO: 184 10^3/UL (ref 134–434)
PMV BLD: 8.9 FL (ref 7.5–11.1)
PROT SERPL-MCNC: 8.1 G/DL (ref 6.4–8.2)
RBC # BLD AUTO: 3.98 M/MM3 (ref 4–5.6)
SODIUM SERPL-SCNC: 140 MMOL/L (ref 136–145)
WBC # BLD AUTO: 6.9 K/MM3 (ref 4–10)

## 2022-04-15 RX ADMIN — HYDROXYZINE PAMOATE SCH MG: 25 CAPSULE ORAL at 05:10

## 2022-04-15 RX ADMIN — HYDROXYZINE PAMOATE SCH MG: 25 CAPSULE ORAL at 17:38

## 2022-04-15 RX ADMIN — GUAIFENESIN PRN ML: 100 SOLUTION ORAL at 22:50

## 2022-04-15 RX ADMIN — GUAIFENESIN PRN ML: 100 SOLUTION ORAL at 06:57

## 2022-04-15 RX ADMIN — GUAIFENESIN PRN ML: 100 SOLUTION ORAL at 10:38

## 2022-04-15 RX ADMIN — BUDESONIDE AND FORMOTEROL FUMARATE DIHYDRATE SCH: 80; 4.5 AEROSOL RESPIRATORY (INHALATION) at 23:01

## 2022-04-15 RX ADMIN — ALBUTEROL SULFATE PRN PUFF: 90 AEROSOL, METERED RESPIRATORY (INHALATION) at 10:39

## 2022-04-15 RX ADMIN — HYDROXYZINE PAMOATE SCH MG: 25 CAPSULE ORAL at 22:46

## 2022-04-15 RX ADMIN — Medication SCH MG: at 22:46

## 2022-04-15 RX ADMIN — IBUPROFEN PRN MG: 400 TABLET, FILM COATED ORAL at 22:49

## 2022-04-15 RX ADMIN — HYDROXYZINE PAMOATE SCH: 25 CAPSULE ORAL at 14:50

## 2022-04-15 RX ADMIN — METHOCARBAMOL PRN MG: 500 TABLET ORAL at 03:30

## 2022-04-15 RX ADMIN — AMLODIPINE BESYLATE SCH MG: 10 TABLET ORAL at 10:36

## 2022-04-15 RX ADMIN — IBUPROFEN PRN MG: 400 TABLET, FILM COATED ORAL at 03:29

## 2022-04-15 RX ADMIN — GUAIFENESIN PRN ML: 100 SOLUTION ORAL at 15:45

## 2022-04-15 RX ADMIN — GUAIFENESIN PRN ML: 100 SOLUTION ORAL at 03:31

## 2022-04-15 RX ADMIN — HYDROXYZINE PAMOATE SCH MG: 25 CAPSULE ORAL at 10:38

## 2022-04-15 RX ADMIN — LISINOPRIL SCH MG: 10 TABLET ORAL at 10:36

## 2022-04-15 RX ADMIN — Medication SCH TAB: at 10:36

## 2022-04-15 RX ADMIN — BUDESONIDE AND FORMOTEROL FUMARATE DIHYDRATE SCH: 80; 4.5 AEROSOL RESPIRATORY (INHALATION) at 10:39

## 2022-04-16 RX ADMIN — BUDESONIDE AND FORMOTEROL FUMARATE DIHYDRATE SCH: 80; 4.5 AEROSOL RESPIRATORY (INHALATION) at 22:38

## 2022-04-16 RX ADMIN — METHOCARBAMOL PRN MG: 500 TABLET ORAL at 10:44

## 2022-04-16 RX ADMIN — LISINOPRIL SCH MG: 10 TABLET ORAL at 10:42

## 2022-04-16 RX ADMIN — BUDESONIDE AND FORMOTEROL FUMARATE DIHYDRATE SCH: 80; 4.5 AEROSOL RESPIRATORY (INHALATION) at 12:16

## 2022-04-16 RX ADMIN — Medication SCH TAB: at 10:40

## 2022-04-16 RX ADMIN — AMLODIPINE BESYLATE SCH MG: 10 TABLET ORAL at 10:42

## 2022-04-16 RX ADMIN — GUAIFENESIN PRN ML: 100 SOLUTION ORAL at 15:47

## 2022-04-16 RX ADMIN — HYDROXYZINE PAMOATE SCH MG: 25 CAPSULE ORAL at 22:38

## 2022-04-16 RX ADMIN — HYDROXYZINE PAMOATE SCH MG: 25 CAPSULE ORAL at 05:59

## 2022-04-16 RX ADMIN — HYDROXYZINE PAMOATE SCH MG: 25 CAPSULE ORAL at 10:42

## 2022-04-16 RX ADMIN — GUAIFENESIN PRN ML: 100 SOLUTION ORAL at 08:38

## 2022-04-16 RX ADMIN — Medication SCH MG: at 22:38

## 2022-04-16 RX ADMIN — METHOCARBAMOL PRN MG: 500 TABLET ORAL at 22:39

## 2022-04-16 RX ADMIN — HYDROXYZINE PAMOATE SCH: 25 CAPSULE ORAL at 14:42

## 2022-04-16 RX ADMIN — IBUPROFEN PRN MG: 400 TABLET, FILM COATED ORAL at 17:45

## 2022-04-16 RX ADMIN — HYDROXYZINE PAMOATE SCH MG: 25 CAPSULE ORAL at 17:43

## 2022-04-17 RX ADMIN — ACETAMINOPHEN PRN MG: 325 TABLET ORAL at 10:24

## 2022-04-17 RX ADMIN — HYDROXYZINE PAMOATE SCH: 25 CAPSULE ORAL at 21:44

## 2022-04-17 RX ADMIN — ALBUTEROL SULFATE PRN PUFF: 90 AEROSOL, METERED RESPIRATORY (INHALATION) at 22:21

## 2022-04-17 RX ADMIN — Medication SCH MG: at 22:21

## 2022-04-17 RX ADMIN — IBUPROFEN PRN MG: 400 TABLET, FILM COATED ORAL at 22:20

## 2022-04-17 RX ADMIN — AMLODIPINE BESYLATE SCH MG: 10 TABLET ORAL at 10:24

## 2022-04-17 RX ADMIN — HYDROXYZINE PAMOATE SCH: 25 CAPSULE ORAL at 10:26

## 2022-04-17 RX ADMIN — ALBUTEROL SULFATE PRN PUFF: 90 AEROSOL, METERED RESPIRATORY (INHALATION) at 11:27

## 2022-04-17 RX ADMIN — HYDROXYZINE PAMOATE SCH MG: 25 CAPSULE ORAL at 22:21

## 2022-04-17 RX ADMIN — ALBUTEROL SULFATE PRN PUFF: 90 AEROSOL, METERED RESPIRATORY (INHALATION) at 17:31

## 2022-04-17 RX ADMIN — BUDESONIDE AND FORMOTEROL FUMARATE DIHYDRATE SCH PUFF: 80; 4.5 AEROSOL RESPIRATORY (INHALATION) at 22:44

## 2022-04-17 RX ADMIN — BUDESONIDE AND FORMOTEROL FUMARATE DIHYDRATE SCH PUFF: 80; 4.5 AEROSOL RESPIRATORY (INHALATION) at 11:26

## 2022-04-17 RX ADMIN — HYDROXYZINE PAMOATE SCH MG: 25 CAPSULE ORAL at 17:29

## 2022-04-17 RX ADMIN — HYDROXYZINE PAMOATE SCH MG: 25 CAPSULE ORAL at 05:12

## 2022-04-17 RX ADMIN — Medication SCH TAB: at 10:24

## 2022-04-17 RX ADMIN — METHOCARBAMOL PRN MG: 500 TABLET ORAL at 22:20

## 2022-04-17 RX ADMIN — LISINOPRIL SCH MG: 10 TABLET ORAL at 10:24

## 2022-04-17 RX ADMIN — METHOCARBAMOL PRN MG: 500 TABLET ORAL at 17:32

## 2022-04-17 RX ADMIN — GUAIFENESIN PRN ML: 100 SOLUTION ORAL at 10:22

## 2022-04-18 VITALS — SYSTOLIC BLOOD PRESSURE: 138 MMHG | DIASTOLIC BLOOD PRESSURE: 88 MMHG | TEMPERATURE: 97.3 F | HEART RATE: 64 BPM

## 2022-04-18 RX ADMIN — HYDROXYZINE PAMOATE SCH: 25 CAPSULE ORAL at 07:11

## 2022-04-18 RX ADMIN — BUDESONIDE AND FORMOTEROL FUMARATE DIHYDRATE SCH: 80; 4.5 AEROSOL RESPIRATORY (INHALATION) at 10:14

## 2022-04-18 RX ADMIN — HYDROXYZINE PAMOATE SCH: 25 CAPSULE ORAL at 10:14

## 2022-04-18 RX ADMIN — Medication SCH: at 10:14

## 2022-04-18 RX ADMIN — LISINOPRIL SCH: 10 TABLET ORAL at 10:14

## 2022-04-18 RX ADMIN — ACETAMINOPHEN PRN MG: 325 TABLET ORAL at 05:49

## 2022-04-18 RX ADMIN — AMLODIPINE BESYLATE SCH: 10 TABLET ORAL at 10:14
